# Patient Record
Sex: FEMALE | Race: WHITE | NOT HISPANIC OR LATINO | Employment: FULL TIME | URBAN - METROPOLITAN AREA
[De-identification: names, ages, dates, MRNs, and addresses within clinical notes are randomized per-mention and may not be internally consistent; named-entity substitution may affect disease eponyms.]

---

## 2017-03-27 ENCOUNTER — ALLSCRIPTS OFFICE VISIT (OUTPATIENT)
Dept: OTHER | Facility: OTHER | Age: 27
End: 2017-03-27

## 2017-03-27 DIAGNOSIS — R63.5 ABNORMAL WEIGHT GAIN: ICD-10-CM

## 2017-03-27 DIAGNOSIS — R35.0 FREQUENCY OF MICTURITION: ICD-10-CM

## 2017-03-27 LAB
BILIRUB UR QL STRIP: NEGATIVE
CLARITY UR: NORMAL
COLOR UR: YELLOW
GLUCOSE (HISTORICAL): NEGATIVE
HGB UR QL STRIP.AUTO: NEGATIVE
KETONES UR STRIP-MCNC: NEGATIVE MG/DL
LEUKOCYTE ESTERASE UR QL STRIP: NEGATIVE
NITRITE UR QL STRIP: NEGATIVE
PH UR STRIP.AUTO: 8 [PH]
PROT UR STRIP-MCNC: NEGATIVE MG/DL
SP GR UR STRIP.AUTO: 1.01
UROBILINOGEN UR QL STRIP.AUTO: 0.2

## 2017-04-05 ENCOUNTER — LAB CONVERSION - ENCOUNTER (OUTPATIENT)
Dept: OTHER | Facility: OTHER | Age: 27
End: 2017-04-05

## 2017-04-05 ENCOUNTER — GENERIC CONVERSION - ENCOUNTER (OUTPATIENT)
Dept: OTHER | Facility: OTHER | Age: 27
End: 2017-04-05

## 2017-04-05 LAB
A/G RATIO (HISTORICAL): 1.8 (CALC) (ref 1–2.5)
ALBUMIN SERPL BCP-MCNC: 4.6 G/DL (ref 3.6–5.1)
ALP SERPL-CCNC: 56 U/L (ref 33–115)
ALT SERPL W P-5'-P-CCNC: 22 U/L (ref 6–29)
ANTI-NUCLEAR ANTIBODY (ANA) (HISTORICAL): NEGATIVE
AST SERPL W P-5'-P-CCNC: 19 U/L (ref 10–30)
BASOPHILS # BLD AUTO: 0.3 %
BASOPHILS # BLD AUTO: 18 CELLS/UL (ref 0–200)
BILIRUB SERPL-MCNC: 0.5 MG/DL (ref 0.2–1.2)
BUN SERPL-MCNC: 16 MG/DL (ref 7–25)
BUN/CREA RATIO (HISTORICAL): NORMAL (CALC) (ref 6–22)
CALCIUM SERPL-MCNC: 9.8 MG/DL (ref 8.6–10.2)
CHLORIDE SERPL-SCNC: 103 MMOL/L (ref 98–110)
CHOLEST SERPL-MCNC: 184 MG/DL (ref 125–200)
CHOLEST/HDLC SERPL: 3.2 (CALC)
CO2 SERPL-SCNC: 26 MMOL/L (ref 20–31)
CREAT SERPL-MCNC: 0.65 MG/DL (ref 0.5–1.1)
DEPRECATED RDW RBC AUTO: 13 % (ref 11–15)
EGFR AFRICAN AMERICAN (HISTORICAL): 141 ML/MIN/1.73M2
EGFR-AMERICAN CALC (HISTORICAL): 122 ML/MIN/1.73M2
EOSINOPHIL # BLD AUTO: 570 CELLS/UL (ref 15–500)
EOSINOPHIL # BLD AUTO: 9.5 %
ERYTHROCYTE SEDIMENTATION RATE (HISTORICAL): 2 MM/H
GAMMA GLOBULIN (HISTORICAL): 2.6 G/DL (CALC) (ref 1.9–3.7)
GLUCOSE (HISTORICAL): 88 MG/DL (ref 65–99)
HCT VFR BLD AUTO: 45.6 % (ref 35–45)
HDLC SERPL-MCNC: 57 MG/DL
HGB BLD-MCNC: 15 G/DL (ref 11.7–15.5)
LDL CHOLESTEROL (HISTORICAL): 103 MG/DL (CALC)
LYMPHOCYTES # BLD AUTO: 1368 CELLS/UL (ref 850–3900)
LYMPHOCYTES # BLD AUTO: 22.8 %
MCH RBC QN AUTO: 30.2 PG (ref 27–33)
MCHC RBC AUTO-ENTMCNC: 33 G/DL (ref 32–36)
MCV RBC AUTO: 91.3 FL (ref 80–100)
MONOCYTES # BLD AUTO: 378 CELLS/UL (ref 200–950)
MONOCYTES (HISTORICAL): 6.3 %
NEUTROPHILS # BLD AUTO: 3666 CELLS/UL (ref 1500–7800)
NEUTROPHILS # BLD AUTO: 61.1 %
NON-HDL-CHOL (CHOL-HDL) (HISTORICAL): 127 MG/DL (CALC)
PLATELET # BLD AUTO: 235 THOUSAND/UL (ref 140–400)
PMV BLD AUTO: 9.3 FL (ref 7.5–12.5)
POTASSIUM SERPL-SCNC: 4.3 MMOL/L (ref 3.5–5.3)
RBC # BLD AUTO: 4.99 MILLION/UL (ref 3.8–5.1)
SODIUM SERPL-SCNC: 138 MMOL/L (ref 135–146)
TOTAL PROTEIN (HISTORICAL): 7.2 G/DL (ref 6.1–8.1)
TRIGL SERPL-MCNC: 121 MG/DL
TSH SERPL DL<=0.05 MIU/L-ACNC: 1.97 MIU/L
WBC # BLD AUTO: 6 THOUSAND/UL (ref 3.8–10.8)

## 2018-01-10 ENCOUNTER — ALLSCRIPTS OFFICE VISIT (OUTPATIENT)
Dept: OTHER | Facility: OTHER | Age: 28
End: 2018-01-10

## 2018-01-12 NOTE — PROGRESS NOTES
Assessment   1  Vaginal candidiasis (112 1) (B37 3)   2  Reactive airway disease (493 90) (J45 909)   3  Sinusitis (473 9) (J32 9)    Plan   Reactive airway disease    · From  EpiPen 2-Rene 0 3 MG/0 3ML DONYA USE AS DIRECTED To    EPINEPHrine 0 3 MG/0 3ML Injection Solution Auto-injector INJECT 0 3ML    INTRAMUSCULARLY AS DIRECTED   · Ventolin  (90 Base) MCG/ACT Inhalation Aerosol Solution; INHALE    TWO PUFFS EVERY 4 TO 6 HOURS AS NEEDED  Reactive airway disease, Vaginal candidiasis    · Doxycycline Monohydrate 100 MG Oral Capsule; TAKE 1 CAPSULE EVERY 12    HOURS DAILY  Vaginal candidiasis    · Fluconazole 150 MG Oral Tablet; TAKE AS DIRECTED DAILY    Discussion/Summary      Sinusitis  Patient given prescription for doxycycline to use as directed  airway disease  Patient given sample of Asmanex inhaler to use 2 inhalations twice daily  She may use her rescue inhaler as needed  candidiasis  Patient given fluconazole for history of candidiasis with antibiotics  Chief Complaint   Patient is here c/o a persistent cough and post nasal drip x's 2+ wks  All medications were reviewed and updated with the patient  History of Present Illness   HPI: I reviewed chief complaint with the patient  She denies any documented fevers  She has had some mild productive coughing feels she does not always obtain a good inspiration  She is a nonsmoker  Review of Systems        Constitutional: feeling tired  ENT: Sinus congestion  Cardiovascular: no complaints of slow or fast heart rate, no chest pain, no palpitations, no leg claudication or lower extremity edema  Respiratory: as noted in HPI  Gastrointestinal: no complaints of abdominal pain, no constipation, no nausea or diarrhea, no vomiting, no bloody stools  Genitourinary: no complaints of dysuria, no incontinence, no pelvic pain, no dysmenorrhea, no vaginal discharge or abnormal vaginal bleeding        Musculoskeletal: no complaints of arthralgia, no myalgia, no joint swelling or stiffness, no limb pain or swelling  Integumentary: no complaints of skin rash or lesion, no itching or dry skin, no skin wounds  Active Problems   1  Dermatitis (692 9) (L30 9)   2  Reactive airway disease (493 90) (J45 909)   3  Urinary frequency (788 41) (R35 0)   4  Weight gain (783 1) (R63 5)    Family History   Father    1  Family history of Hypertension (V17 49)  Family History    2  Family history of Cancer   3  Family history of Diabetes Mellitus (V18 0)   4  Family history of Heart Disease (V17 49)   5  Family history of Transient Ischemic Attack    Social History    · Being A Social Drinker   · Never A Smoker  The social history was reviewed and updated today  Surgical History   1  History of Lymphatic Surgery    Current Meds    1  EpiPen 2-Rene 0 3 MG/0 3ML DONYA; USE AS DIRECTED; Therapy: 08VLR1141 to (Last Rx:02Oct2013)  Requested for: 09WFE5104 Ordered   2  Oxybutynin Chloride ER 5 MG Oral Tablet Extended Release 24 Hour; TAKE ONE (1)     TABLET(S) DAILY; Therapy: 63Ezv2153 to (Scottie Unger)  Requested for: 71YOL8725; Last     Rx:05Jan2018 Ordered   3  Ventolin  (90 Base) MCG/ACT Inhalation Aerosol Solution; INHALE TWO PUFFS     EVERY 4 TO 6 HOURS AS NEEDED; Therapy: 47ATM6449 to ([facility])  Requested for: 51JEB5418; Last     Rx:[id number] Ordered     The medication list was reviewed and updated today  Allergies   1  Azithromycin PACK   2  Levaquin TABS   3  Penicillins   4  Shellfish-derived Products   5  Sulfa Drugs    Vitals    Recorded: 78WLP7833 09:34AM   Temperature 96 9 F   Heart Rate 72   Respiration 16   Systolic 535   Diastolic 60   Height 5 ft 1 in   Weight 171 lb 2 oz   BMI Calculated 32 33   BSA Calculated 1 77     Physical Exam        Constitutional      General appearance: No acute distress, well appearing and well nourished         Ears, Nose, Mouth, and Throat      External inspection of ears and nose: Normal        Otoscopic examination: Tympanic membranes translucent with normal light reflex  Canals patent without erythema  Nasal mucosa, septum, and turbinates: Normal without edema or erythema  Oropharynx: Normal with no erythema, edema, exudate or lesions  Pulmonary      Respiratory effort: No increased work of breathing or signs of respiratory distress  Auscultation of lungs: Abnormal  -- Mild inspiratory squeaking with deep inspiration  Cardiovascular      Auscultation of heart: Normal rate and rhythm, normal S1 and S2, without murmurs  Lymphatic      Palpation of lymph nodes in neck: No lymphadenopathy            Signatures    Electronically signed by : RADHA Us ; Mihir 10 [id number] 11:16AM EST                       (Author)

## 2018-01-14 VITALS
BODY MASS INDEX: 32 KG/M2 | WEIGHT: 169.5 LBS | DIASTOLIC BLOOD PRESSURE: 86 MMHG | SYSTOLIC BLOOD PRESSURE: 122 MMHG | TEMPERATURE: 98.5 F | RESPIRATION RATE: 16 BRPM | HEIGHT: 61 IN | HEART RATE: 68 BPM

## 2018-01-16 NOTE — RESULT NOTES
Verified Results  (1) CBC/PLT/DIFF [id number] 91:08DJ [employer]     Test Name Result Flag Reference   WHITE BLOOD CELL COUNT 6 0 Thousand/uL  3 8-10 8   RED BLOOD CELL COUNT 4 99 Million/uL  3 80-5 10   HEMOGLOBIN 15 0 g/dL  11 7-15 5   HEMATOCRIT 45 6 % H 35 0-45 0   MCV 91 3 fL  80 0-100 0   MCH 30 2 pg  27 0-33 0   MCHC 33 0 g/dL  32 0-36 0   RDW 13 0 %  11 0-15 0   PLATELET COUNT 691 Thousand/uL  140-400   MPV 9 3 fL  7 5-12 5   ABSOLUTE NEUTROPHILS 3666 cells/uL  3883-5702   ABSOLUTE LYMPHOCYTES 1368 cells/uL  850-3900   ABSOLUTE MONOCYTES 378 cells/uL  200-950   ABSOLUTE EOSINOPHILS 570 cells/uL H    ABSOLUTE BASOPHILS 18 cells/uL  0-200   NEUTROPHILS 61 1 %     LYMPHOCYTES 22 8 %     MONOCYTES 6 3 %     EOSINOPHILS 9 5 %     BASOPHILS 0 3 %       (1) COMPREHENSIVE METABOLIC PANEL [id number] 83:14VC [email or web address]     Test Name Result Flag Reference   GLUCOSE 88 mg/dL  65-99   Fasting reference interval   UREA NITROGEN (BUN) 16 mg/dL  7-25   CREATININE 0 65 mg/dL  0 50-1 10   eGFR NON-AFR   AMERICAN 122 mL/min/1 73m2  > OR = 60   eGFR AFRICAN AMERICAN 141 mL/min/1 73m2  > OR = 60   BUN/CREATININE RATIO   1-14   NOT APPLICABLE (calc)   SODIUM 138 mmol/L  135-146   POTASSIUM 4 3 mmol/L  3 5-5 3   CHLORIDE 103 mmol/L     CARBON DIOXIDE 26 mmol/L  20-31   CALCIUM 9 8 mg/dL  8 6-10 2   PROTEIN, TOTAL 7 2 g/dL  6 1-8 1   ALBUMIN 4 6 g/dL  3 6-5 1   GLOBULIN 2 6 g/dL (calc)  1 9-3 7   ALBUMIN/GLOBULIN RATIO 1 8 (calc)  1 0-2 5   BILIRUBIN, TOTAL 0 5 mg/dL  0 2-1 2   ALKALINE PHOSPHATASE 56 U/L     AST 19 U/L  10-30   ALT 22 U/L  6-29     (1) LIPID PANEL, FASTING [id number] 83:49GJ AleksandrChavo Ricketts     Test Name Result Flag Reference   CHOLESTEROL, TOTAL 184 mg/dL  125-200   HDL CHOLESTEROL 57 mg/dL  > OR = 46   TRIGLICERIDES 338 mg/dL  <150   LDL-CHOLESTEROL 103 mg/dL (calc)  <130   Desirable range <100 mg/dL for patients with CHD or  diabetes and <70 mg/dL for diabetic patients with  known heart disease  CHOL/HDLC RATIO 3 2 (calc)  < OR = 5 0   NON HDL CHOLESTEROL 127 mg/dL (calc)     Target for non-HDL cholesterol is 30 mg/dL higher than   LDL cholesterol target  (Q) SED RATE BY MODIFIED Simba Irizarry [id number] 01:13QS Chavo Jeter     Test Name Result Flag Reference   SED RATE BY MODIFIED$WESTERGREN 2 mm/h  < OR = 20     (Q) CARMEN SCREEN, IFA, WITH REFLEX TO TITER AND PATTERN [id number] 55:52JI Chavo Jeter     Test Name Result Flag Reference   CARMEN SCREEN, IFA NEGATIVE  NEGATIVE   CARMEN IFA is a first line screen for detecting the  presence of up to approximately 150 autoantibodies in  various autoimmune diseases  A negative CARMEN IFA result  suggests CARMEN-associated autoimmune diseases are not  present at this time  Visit Physician FAQs for interpretation of all  antibodies in the [place], prevalence, and association  with diseases at [email or web address] [email or web address]  faq/WRE174     (Q) TSH, 3RD GENERATION [id number] 89:27UA Chavo Dc   REPORT COMMENT:  FASTING:YES     Test Name Result Flag Reference   TSH 1 97 mIU/L     Reference Range                         > or = 20 Years  0 40-4 50                              Pregnancy Ranges            First trimester    0 26-2 66            Second trimester   0 55-2 73            Third trimester    0 43-2 91       Discussion/Summary   all bw was ok for her  I will send in med for bladder if pt is still agreeable

## 2018-01-23 VITALS
HEIGHT: 61 IN | SYSTOLIC BLOOD PRESSURE: 112 MMHG | RESPIRATION RATE: 16 BRPM | BODY MASS INDEX: 32.31 KG/M2 | DIASTOLIC BLOOD PRESSURE: 60 MMHG | WEIGHT: 171.13 LBS | HEART RATE: 72 BPM | TEMPERATURE: 96.9 F

## 2018-09-20 RX ORDER — DROSPIRENONE AND ETHINYL ESTRADIOL 0.03MG-3MG
KIT ORAL
COMMUNITY
Start: 2015-09-29 | End: 2019-02-25

## 2018-09-20 RX ORDER — FLUCONAZOLE 150 MG/1
TABLET ORAL DAILY
COMMUNITY
Start: 2018-01-10 | End: 2019-02-25

## 2018-09-20 RX ORDER — EPINEPHRINE 0.3 MG/.3ML
0.3 INJECTION SUBCUTANEOUS
COMMUNITY
Start: 2013-10-02 | End: 2020-03-16 | Stop reason: SDUPTHER

## 2018-09-20 RX ORDER — OXYBUTYNIN CHLORIDE 5 MG/1
1 TABLET, EXTENDED RELEASE ORAL DAILY
COMMUNITY
Start: 2017-04-07 | End: 2018-12-07 | Stop reason: SDUPTHER

## 2018-09-20 RX ORDER — ALBUTEROL SULFATE 90 UG/1
2 AEROSOL, METERED RESPIRATORY (INHALATION)
COMMUNITY
Start: 2013-10-02 | End: 2020-03-16 | Stop reason: SDUPTHER

## 2018-12-07 DIAGNOSIS — N32.81 OAB (OVERACTIVE BLADDER): Primary | ICD-10-CM

## 2018-12-07 RX ORDER — OXYBUTYNIN CHLORIDE 5 MG/1
TABLET, EXTENDED RELEASE ORAL
Qty: 30 TABLET | Refills: 2 | Status: SHIPPED | OUTPATIENT
Start: 2018-12-07 | End: 2019-12-27 | Stop reason: SDUPTHER

## 2019-02-25 ENCOUNTER — OFFICE VISIT (OUTPATIENT)
Dept: FAMILY MEDICINE CLINIC | Facility: CLINIC | Age: 29
End: 2019-02-25
Payer: COMMERCIAL

## 2019-02-25 VITALS
DIASTOLIC BLOOD PRESSURE: 80 MMHG | SYSTOLIC BLOOD PRESSURE: 130 MMHG | BODY MASS INDEX: 34.59 KG/M2 | HEART RATE: 92 BPM | WEIGHT: 176.2 LBS | RESPIRATION RATE: 16 BRPM | TEMPERATURE: 98.3 F | HEIGHT: 60 IN

## 2019-02-25 DIAGNOSIS — F41.9 ANXIETY: Primary | ICD-10-CM

## 2019-02-25 DIAGNOSIS — F41.8 DEPRESSION WITH ANXIETY: ICD-10-CM

## 2019-02-25 PROBLEM — R35.0 INCREASED FREQUENCY OF URINATION: Status: ACTIVE | Noted: 2017-03-27

## 2019-02-25 PROCEDURE — 1036F TOBACCO NON-USER: CPT | Performed by: FAMILY MEDICINE

## 2019-02-25 PROCEDURE — 3008F BODY MASS INDEX DOCD: CPT | Performed by: FAMILY MEDICINE

## 2019-02-25 PROCEDURE — 99213 OFFICE O/P EST LOW 20 MIN: CPT | Performed by: FAMILY MEDICINE

## 2019-02-25 NOTE — ASSESSMENT & PLAN NOTE
Depression with anxiety  I had a long discussion with the patient and we decided to initiate Zoloft at 25 mg for the 1st 4-5 days followed by 50 mg daily  She will continue see her therapist and will call within 2-4 weeks if symptoms have not begun to improve where we will titrate accordingly

## 2019-02-25 NOTE — PROGRESS NOTES
FAMILY PRACTICE OFFICE VISIT       NAME: Bob Coughlin  AGE: 33 y o  SEX: female       : 1990        MRN: [id number]    DATE: 2019  TIME: 12:12 PM    Assessment and Plan     Problem List Items Addressed This Visit        Other    Depression with anxiety     Depression with anxiety  I had a long discussion with the patient and we decided to initiate Zoloft at 25 mg for the 1st 4-5 days followed by 50 mg daily  She will continue see her therapist and will call within 2-4 weeks if symptoms have not begun to improve where we will titrate accordingly  Other Visit Diagnoses     Anxiety    -  Primary    Relevant Medications    sertraline (ZOLOFT) 50 mg tablet            There are no Patient Instructions on file for this visit  Chief Complaint     Chief Complaint   Patient presents with    Depression     Pt is here to discuss meds for depression       History of Present Illness     Patient states for the past few months she has noticed increased fatigue with decreased concentration in her work duties  She does not participate in activities she used to like doing such as drawing and writing  She works as a  for [employer]  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 with more regular at hours of 2:30 p m  To 10:30 p m  5 days a week  She has also had comments from family members such as her sister who is 5 months pregnant  Patient unaware of any triggers for her symptoms  She describes at times awakening in the middle of the night with feelings of an anxiety attack and shortness of breath  Patient did start to see a therapist recently      Review of Systems   Review of Systems   Constitutional: Positive for fatigue  Psychiatric/Behavioral: Positive for decreased concentration, dysphoric mood and sleep disturbance  The patient is nervous/anxious          Active Problem List     Patient Active Problem List   Diagnosis    Increased frequency of urination    Reactive airway disease    Depression with anxiety       Past Medical History:  No past medical history on file      Past Surgical History:  Past Surgical History:   Procedure Laterality Date    OTHER SURGICAL HISTORY      Lymphatic Surgery       Family History:  Family History   Problem Relation Age of Onset    Hypertension Father     Cancer Family     Diabetes Family     Heart disease Family     Transient ischemic attack Family        Social History:  Social History     Socioeconomic History    Marital status: Single     Spouse name: Not on file    Number of children: Not on file    Years of education: Not on file    Highest education level: Not on file   Occupational History    Not on file   Social Needs    Financial resource strain: Not on file    Food insecurity:     Worry: Not on file     Inability: Not on file    Transportation needs:     Medical: Not on file     Non-medical: Not on file   Tobacco Use    Smoking status: Never Smoker    Smokeless tobacco: Never Used   Substance and Sexual Activity    Alcohol use: Yes     Comment: Social    Drug use: Not on file    Sexual activity: Not on file   Lifestyle    Physical activity:     Days per week: Not on file     Minutes per session: Not on file    Stress: Not on file   Relationships    Social connections:     Talks on phone: Not on file     Gets together: Not on file     Attends Quaker service: Not on file     Active member of club or organization: Not on file     Attends meetings of clubs or organizations: Not on file     Relationship status: Not on file    Intimate partner violence:     Fear of current or ex partner: Not on file     Emotionally abused: Not on file     Physically abused: Not on file     Forced sexual activity: Not on file   Other Topics Concern    Not on file   Social History Narrative    Not on file       Objective     Vitals:    02/25/19 1033   BP: 130/80   Pulse: 92   Resp: 16   Temp: 98 3 °F (36 8 °C)     Wt Readings from Last 3 Encounters:   02/25/19 79 9 kg (176 lb 3 2 oz)   01/10/18 77 6 kg (171 lb 2 1 oz)   03/27/17 76 9 kg (169 lb 8 oz)       Physical Exam   Constitutional: She is oriented to person, place, and time  No distress  Cardiovascular:   Regular rate and rhythm with no murmurs   Pulmonary/Chest:   Lungs are clear to auscultation without wheezes,rales, or rhonchi   Neurological: She is alert and oriented to person, place, and time  No cranial nerve deficit  Psychiatric: She has a normal mood and affect  Her behavior is normal  Judgment and thought content normal        Pertinent Laboratory/Diagnostic Studies:  Lab Results   Component Value Date    BUN 16 03/31/2017    CREATININE 0 65 03/31/2017    CALCIUM 9 8 03/31/2017     03/31/2017    K 4 3 03/31/2017    CO2 26 03/31/2017     03/31/2017     Lab Results   Component Value Date    ALT 22 03/31/2017    AST 19 03/31/2017    ALKPHOS 56 03/31/2017    BILITOT 0 5 03/31/2017       Lab Results   Component Value Date    WBC 6 0 03/31/2017    HGB 15 0 03/31/2017    HCT 45 6 (H) 03/31/2017    MCV 91 3 03/31/2017     03/31/2017       No results found for: TSH    Lab Results   Component Value Date    CHOL 184 03/31/2017     Lab Results   Component Value Date    TRIG 121 03/31/2017     Lab Results   Component Value Date    HDL 57 03/31/2017     No results found for: LDLCALC  No results found for: HGBA1C    Results for orders placed or performed in visit on 04/05/17   SEDIMENTATION RATE (HISTORICAL)   Result Value Ref Range    ERYTHROCYTE SEDIMENTATION RATE 2 < OR = 20 mm/h   TSH, 3RD GENERATION (HISTORICAL)   Result Value Ref Range    TSH 3RD GENERATON 1 97 mIU/L   CARMEN,SCREEN (HISTORICAL)   Result Value Ref Range    ANTI-NUCLEAR ANTIBODY (CARMEN) NEGATIVE NEGATIVE       No orders of the defined types were placed in this encounter        ALLERGIES:  Allergies   Allergen Reactions    Penicillins Shortness Of Breath    Shellfish-Derived Products Anaphylaxis     Throat closes    Azithromycin Hives    Levofloxacin Hives    Sulfa Antibiotics Hives       Current Medications     Current Outpatient Medications   Medication Sig Dispense Refill    albuterol (VENTOLIN HFA) 90 mcg/act inhaler Inhale 2 puffs      EPINEPHrine (EPIPEN) 0 3 mg/0 3 mL SOAJ Inject 0 3 mL as directed      Multiple Vitamin (MULTIVITAMINS PO) Take by mouth      oxybutynin (DITROPAN-XL) 5 mg 24 hr tablet TAKE ONE TABLET BY MOUTH DAILY 30 tablet 2    sertraline (ZOLOFT) 50 mg tablet Take 1 tablet (50 mg total) by mouth daily 30 tablet 5     No current facility-administered medications for this visit            Health Maintenance     Health Maintenance   Topic Date Due    BMI: Followup Plan  08/11/2007    BMI: Adult  08/11/2007    DTaP,Tdap,and Td Vaccines (1 - Tdap) 08/11/2010    PAP SMEAR  08/11/2010    INFLUENZA VACCINE  07/01/2018    Depression Screening PHQ  03/25/2019 (Originally 1989)    HEPATITIS B VACCINES  Aged Out     Immunization History   Administered Date(s) Administered    DTP 1989, 02/26/1990, 12/10/1990, 06/10/1991    HiB 12/10/1990    MMR 12/10/1990    OPV 1989, 02/26/1990, 47/17/5196       Connie Bartlett MD

## 2019-06-03 ENCOUNTER — TELEPHONE (OUTPATIENT)
Dept: FAMILY MEDICINE CLINIC | Facility: CLINIC | Age: 29
End: 2019-06-03

## 2019-06-03 DIAGNOSIS — F41.9 ANXIETY: ICD-10-CM

## 2019-06-03 RX ORDER — SERTRALINE HYDROCHLORIDE 100 MG/1
100 TABLET, FILM COATED ORAL DAILY
Qty: 30 TABLET | Refills: 5 | Status: SHIPPED | OUTPATIENT
Start: 2019-06-03 | End: 2020-01-27

## 2019-06-12 ENCOUNTER — TELEPHONE (OUTPATIENT)
Dept: FAMILY MEDICINE CLINIC | Facility: CLINIC | Age: 29
End: 2019-06-12

## 2019-12-27 DIAGNOSIS — N32.81 OAB (OVERACTIVE BLADDER): ICD-10-CM

## 2019-12-27 RX ORDER — OXYBUTYNIN CHLORIDE 5 MG/1
TABLET, EXTENDED RELEASE ORAL
Qty: 30 TABLET | Refills: 3 | Status: SHIPPED | OUTPATIENT
Start: 2019-12-27 | End: 2020-03-16 | Stop reason: SDUPTHER

## 2020-01-25 DIAGNOSIS — F41.9 ANXIETY: ICD-10-CM

## 2020-01-27 RX ORDER — SERTRALINE HYDROCHLORIDE 100 MG/1
TABLET, FILM COATED ORAL
Qty: 30 TABLET | Refills: 0 | Status: SHIPPED | OUTPATIENT
Start: 2020-01-27 | End: 2020-03-16 | Stop reason: SDUPTHER

## 2020-03-16 ENCOUNTER — OFFICE VISIT (OUTPATIENT)
Dept: FAMILY MEDICINE CLINIC | Facility: CLINIC | Age: 30
End: 2020-03-16
Payer: COMMERCIAL

## 2020-03-16 VITALS
DIASTOLIC BLOOD PRESSURE: 80 MMHG | WEIGHT: 155.8 LBS | BODY MASS INDEX: 29.42 KG/M2 | TEMPERATURE: 96.8 F | OXYGEN SATURATION: 98 % | RESPIRATION RATE: 15 BRPM | HEART RATE: 63 BPM | SYSTOLIC BLOOD PRESSURE: 120 MMHG | HEIGHT: 61 IN

## 2020-03-16 DIAGNOSIS — F41.9 ANXIETY: ICD-10-CM

## 2020-03-16 DIAGNOSIS — J45.909 MILD REACTIVE AIRWAYS DISEASE, UNSPECIFIED WHETHER PERSISTENT: Primary | ICD-10-CM

## 2020-03-16 DIAGNOSIS — L30.9 ECZEMA, UNSPECIFIED TYPE: ICD-10-CM

## 2020-03-16 DIAGNOSIS — N32.81 OAB (OVERACTIVE BLADDER): ICD-10-CM

## 2020-03-16 PROCEDURE — 3008F BODY MASS INDEX DOCD: CPT | Performed by: FAMILY MEDICINE

## 2020-03-16 PROCEDURE — 1036F TOBACCO NON-USER: CPT | Performed by: FAMILY MEDICINE

## 2020-03-16 PROCEDURE — 99213 OFFICE O/P EST LOW 20 MIN: CPT | Performed by: FAMILY MEDICINE

## 2020-03-16 RX ORDER — OXYBUTYNIN CHLORIDE 5 MG/1
5 TABLET, EXTENDED RELEASE ORAL DAILY
Qty: 90 TABLET | Refills: 3 | Status: SHIPPED | OUTPATIENT
Start: 2020-03-16 | End: 2021-07-27 | Stop reason: ALTCHOICE

## 2020-03-16 RX ORDER — SERTRALINE HYDROCHLORIDE 100 MG/1
100 TABLET, FILM COATED ORAL DAILY
Qty: 90 TABLET | Refills: 3 | Status: SHIPPED | OUTPATIENT
Start: 2020-03-16 | End: 2021-06-28 | Stop reason: SDUPTHER

## 2020-03-16 RX ORDER — ALBUTEROL SULFATE 90 UG/1
2 AEROSOL, METERED RESPIRATORY (INHALATION) EVERY 6 HOURS PRN
Qty: 1 INHALER | Refills: 3 | Status: SHIPPED | OUTPATIENT
Start: 2020-03-16

## 2020-03-16 RX ORDER — EPINEPHRINE 0.3 MG/.3ML
0.3 INJECTION SUBCUTANEOUS ONCE
Qty: 0.6 ML | Refills: 3 | Status: SHIPPED | OUTPATIENT
Start: 2020-03-16 | End: 2021-07-27

## 2020-03-16 RX ORDER — DESONIDE 0.5 MG/G
CREAM TOPICAL 2 TIMES DAILY
Qty: 60 G | Refills: 2 | Status: SHIPPED | OUTPATIENT
Start: 2020-03-16

## 2020-03-16 RX ORDER — ALBUTEROL SULFATE 2.5 MG/3ML
2.5 SOLUTION RESPIRATORY (INHALATION) EVERY 6 HOURS PRN
Qty: 25 VIAL | Refills: 5 | Status: SHIPPED | OUTPATIENT
Start: 2020-03-16

## 2020-03-16 NOTE — ASSESSMENT & PLAN NOTE
Eczema  Patient given prescription for DesOwen cream to apply twice daily to the affected area  She was also referred to allergist for further evaluation and treatment of her symptoms

## 2020-03-16 NOTE — PROGRESS NOTES
FAMILY PRACTICE OFFICE VISIT       NAME: Tung Coughlin  AGE: 26 y o  SEX: female       : 1990        MRN: [id number]    DATE: 3/16/2020  TIME: 4:22 PM    Assessment and Plan     Problem List Items Addressed This Visit        Respiratory    Reactive airway disease - Primary    Relevant Medications    EPINEPHrine (EPIPEN) 0 3 mg/0 3 mL SOAJ    albuterol (2 5 mg/3 mL) 0 083 % nebulizer solution    albuterol (Ventolin HFA) 90 mcg/act inhaler    Other Relevant Orders    Ambulatory referral to Allergy       Musculoskeletal and Integument    Eczema     Eczema  Patient given prescription for DesOwen cream to apply twice daily to the affected area  She was also referred to allergist for further evaluation and treatment of her symptoms  Relevant Medications    desonide (DESOWEN) 0 05 % cream       Genitourinary    OAB (overactive bladder)     Overactive bladder  Patient was given a refill on her oxybutynin medication as requested         Relevant Medications    oxybutynin (DITROPAN-XL) 5 mg 24 hr tablet       Other    Anxiety     Anxiety  Patient was given a refill on her sertraline medication as ordered         Relevant Medications    sertraline (ZOLOFT) 100 mg tablet              Chief Complaint     Chief Complaint   Patient presents with    Medication Refill       History of Present Illness     Patient in the office for follow-up of chronic medical condition  She states sertraline has been working well for her anxiety and would like to continue with this medication  She also states oxybutynin has been working for her overactive bladder syndrome  She continues to get episodic flare-ups of her dry pruritic rash that she experiences around her perioral area as well as bilateral antecubital area of arms and anterior neck  She states prior treatments with prednisone did seem to significantly subside symptoms    She is unaware of any prior history of documented allergies      Review of Systems Review of Systems   Constitutional: Negative  Respiratory: Negative  Cardiovascular: Negative  Gastrointestinal: Negative  Genitourinary:        As per HPI   Skin:        As per HPI   Psychiatric/Behavioral:        As per HPI       Active Problem List     Patient Active Problem List   Diagnosis    Increased frequency of urination    Reactive airway disease    Depression with anxiety    OAB (overactive bladder)    Eczema    Anxiety       Past Medical History:  No past medical history on file      Past Surgical History:  Past Surgical History:   Procedure Laterality Date    OTHER SURGICAL HISTORY      Lymphatic Surgery       Family History:  Family History   Problem Relation Age of Onset    Hypertension Father     Cancer Family     Diabetes Family     Heart disease Family     Transient ischemic attack Family        Social History:  Social History     Socioeconomic History    Marital status: Single     Spouse name: Not on file    Number of children: Not on file    Years of education: Not on file    Highest education level: Not on file   Occupational History    Not on file   Social Needs    Financial resource strain: Not on file    Food insecurity:     Worry: Not on file     Inability: Not on file    Transportation needs:     Medical: Not on file     Non-medical: Not on file   Tobacco Use    Smoking status: Never Smoker    Smokeless tobacco: Never Used   Substance and Sexual Activity    Alcohol use: Yes     Comment: Social    Drug use: Not on file    Sexual activity: Not on file   Lifestyle    Physical activity:     Days per week: Not on file     Minutes per session: Not on file    Stress: Not on file   Relationships    Social connections:     Talks on phone: Not on file     Gets together: Not on file     Attends Voodoo service: Not on file     Active member of club or organization: Not on file     Attends meetings of clubs or organizations: Not on file     Relationship status: Not on file    Intimate partner violence:     Fear of current or ex partner: Not on file     Emotionally abused: Not on file     Physically abused: Not on file     Forced sexual activity: Not on file   Other Topics Concern    Not on file   Social History Narrative    Not on file       Objective     Vitals:    03/16/20 1350   BP: 120/80   Pulse: 63   Resp: 15   Temp: (!) 96 8 °F (36 °C)   SpO2: 98%     Wt Readings from Last 3 Encounters:   03/16/20 70 7 kg (155 lb 12 8 oz)   02/25/19 79 9 kg (176 lb 3 2 oz)   01/10/18 77 6 kg (171 lb 2 1 oz)       Physical Exam   Constitutional: No distress  Skin:   Patient with dry pruritic skin around bilateral perioral area extending to anterior chin  She also has similar skin along bilateral antecubital areas of her arms    She has less of a rash along anterior neck in upper chest area       Pertinent Laboratory/Diagnostic Studies:  Lab Results   Component Value Date    BUN 16 03/31/2017    CREATININE 0 65 03/31/2017    CALCIUM 9 8 03/31/2017     03/31/2017    K 4 3 03/31/2017    CO2 26 03/31/2017     03/31/2017     Lab Results   Component Value Date    ALT 22 03/31/2017    AST 19 03/31/2017    ALKPHOS 56 03/31/2017    BILITOT 0 5 03/31/2017       Lab Results   Component Value Date    WBC 6 0 03/31/2017    HGB 15 0 03/31/2017    HCT 45 6 (H) 03/31/2017    MCV 91 3 03/31/2017     03/31/2017       No results found for: TSH    Lab Results   Component Value Date    CHOL 184 03/31/2017     Lab Results   Component Value Date    TRIG 121 03/31/2017     Lab Results   Component Value Date    HDL 57 03/31/2017     No results found for: LDLCALC  No results found for: HGBA1C    Results for orders placed or performed in visit on 04/05/17   SEDIMENTATION RATE (HISTORICAL)   Result Value Ref Range    ERYTHROCYTE SEDIMENTATION RATE 2 < OR = 20 mm/h   TSH, 3RD GENERATION (HISTORICAL)   Result Value Ref Range    TSH 3RD GENERATON 1 97 mIU/L   CARMEN,SCREEN (HISTORICAL) Result Value Ref Range    ANTI-NUCLEAR ANTIBODY (CARMEN) NEGATIVE NEGATIVE       Orders Placed This Encounter   Procedures    Ambulatory referral to Allergy       ALLERGIES:  Allergies   Allergen Reactions    Penicillins Shortness Of Breath    Shellfish-Derived Products Anaphylaxis     Throat closes    Azithromycin Hives    Levofloxacin Hives    Sulfa Antibiotics Hives       Current Medications     Current Outpatient Medications   Medication Sig Dispense Refill    albuterol (Ventolin HFA) 90 mcg/act inhaler Inhale 2 puffs every 6 (six) hours as needed for wheezing 1 Inhaler 3    EPINEPHrine (EPIPEN) 0 3 mg/0 3 mL SOAJ Inject 0 3 mL (0 3 mg total) into a muscle once for 1 dose 0 6 mL 3    Multiple Vitamin (MULTIVITAMINS PO) Take by mouth      oxybutynin (DITROPAN-XL) 5 mg 24 hr tablet Take 1 tablet (5 mg total) by mouth daily 90 tablet 3    sertraline (ZOLOFT) 100 mg tablet Take 1 tablet (100 mg total) by mouth daily 90 tablet 3    albuterol (2 5 mg/3 mL) 0 083 % nebulizer solution Take 1 vial (2 5 mg total) by nebulization every 6 (six) hours as needed for wheezing or shortness of breath 25 vial 5    desonide (DESOWEN) 0 05 % cream Apply topically 2 (two) times a day 60 g 2     No current facility-administered medications for this visit            Health Maintenance     Health Maintenance   Topic Date Due    HIV Screening  08/11/2004    BMI: Followup Plan  08/11/2007    Annual Physical  08/11/2007    Cervical Cancer Screening  08/11/2010    Influenza Vaccine  03/16/2020 (Originally 7/1/2019)    BMI: Adult  03/16/2021    DTaP,Tdap,and Td Vaccines (6 - Td) 08/06/2029    Pneumococcal Vaccine: 65+ Years (1 of 2 - PCV13) 08/11/2054    HIB Vaccine  Completed    Pneumococcal Vaccine: Pediatrics (0 to 5 Years) and At-Risk Patients (6 to 59 Years)  Aged Out    Hepatitis B Vaccine  Aged Out    IPV Vaccine  Aged Out    Hepatitis A Vaccine  Aged Out    Meningococcal ACWY Vaccine  Aged Out    HPV Vaccine  Aged Out     Immunization History   Administered Date(s) Administered    DTP 1989, 02/26/1990, 12/10/1990, 06/10/1991    HiB 12/10/1990    MMR 12/10/1990    OPV 1989, 02/26/1990, 12/10/1990    Tdap 75/97/0797       Andrea Mckenna MD

## 2020-03-24 ENCOUNTER — OFFICE VISIT (OUTPATIENT)
Dept: FAMILY MEDICINE CLINIC | Facility: CLINIC | Age: 30
End: 2020-03-24
Payer: COMMERCIAL

## 2020-03-24 DIAGNOSIS — Z20.828 EXPOSURE TO SARS-ASSOCIATED CORONAVIRUS: ICD-10-CM

## 2020-03-24 DIAGNOSIS — Z20.828 EXPOSURE TO SARS-ASSOCIATED CORONAVIRUS: Primary | ICD-10-CM

## 2020-03-24 PROCEDURE — G2012 BRIEF CHECK IN BY MD/QHP: HCPCS | Performed by: NURSE PRACTITIONER

## 2020-03-24 PROCEDURE — 87635 SARS-COV-2 COVID-19 AMP PRB: CPT

## 2020-03-24 NOTE — PROGRESS NOTES
COVID-19 Virtual Visit     This virtual check-in was done via telephone  Encounter provider [address] W[address]FREDO Martínez    Provider located at [address] [place] [address]    Recent Visits  No visits were found meeting these conditions  Showing recent visits within past 7 days and meeting all other requirements     Today's Visits  Date Type Provider Dept   03/24/20 Office Visit Amy Herbie Peabody, [facility] today's visits and meeting all other requirements     Future Appointments  Date Type Provider Dept   03/24/20 Office Visit Amy Herbie Peabody, [facility]   Showing future appointments within next 150 days and meeting all other requirements        Patient agrees to participate in a virtual check in via telephone or video visit instead of presenting to the office to address urgent/immediate medical needs  Patient is aware this is a billable service  After connecting through telephone, the patient was identified by name and date of birth  Radha Shepard was informed that this was a telemedicine visit and that the exam was being conducted confidentially over secure lines  My office door was closed  No one else was in the room  Radha Shepard acknowledged consent and understanding of privacy and security of the telemedicine visit  I informed the patient that I have reviewed her record in Epic and presented the opportunity for her to ask any questions regarding the visit today  The patient agreed to participate  Radha Shepard is a 26 y o  female who is concerned about COVID-19  She reports cough and headache, itchy throat, joint aches, hot flashes  Has not taken her temperature  She has not traveled outside the [place]  within the last 14 days    She has had contact with a person who is under investigation for or who is positive for COVID-19 within the last 14 days   She has not been hospitalized recently for fever and/or lower respiratory symptoms  Some dyspnea, just feels like she can't catch her breath sometimes  Has asthma, but has not felt she has needed her inhaler yet  She has been using hot tea and hot coffee, which is helpful in relieving symptoms  She works at a [employer] as a   Another employee tested positive for COVID-19  Employee last worked a 2 hour shift sometime between March 12-14th  She does not know if she had contact with the employee, she sees multiple people 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  Her brother in law and her parents have similar symptoms, she was in contact with them over this weekend  No past medical history on file  Past Surgical History:   Procedure Laterality Date    OTHER SURGICAL HISTORY      Lymphatic Surgery       Current Outpatient Medications   Medication Sig Dispense Refill    albuterol (2 5 mg/3 mL) 0 083 % nebulizer solution Take 1 vial (2 5 mg total) by nebulization every 6 (six) hours as needed for wheezing or shortness of breath 25 vial 5    albuterol (Ventolin HFA) 90 mcg/act inhaler Inhale 2 puffs every 6 (six) hours as needed for wheezing 1 Inhaler 3    oxybutynin (DITROPAN-XL) 5 mg 24 hr tablet Take 1 tablet (5 mg total) by mouth daily 90 tablet 3    sertraline (ZOLOFT) 100 mg tablet Take 1 tablet (100 mg total) by mouth daily 90 tablet 3    desonide (DESOWEN) 0 05 % cream Apply topically 2 (two) times a day 60 g 2    EPINEPHrine (EPIPEN) 0 3 mg/0 3 mL SOAJ Inject 0 3 mL (0 3 mg total) into a muscle once for 1 dose 0 6 mL 3    Multiple Vitamin (MULTIVITAMINS PO) Take by mouth       No current facility-administered medications for this visit  Allergies   Allergen Reactions    Penicillins Shortness Of Breath    Shellfish-Derived Products Anaphylaxis     Throat closes    Azithromycin Hives    Levofloxacin Hives    Sulfa Antibiotics Hives       Assessment:   This 26year old female presents today with upper respiratory symptoms that began 4 days ago  Symptoms are likely viral URI, however cannot exclude COVID-19 as she may have had contact with a co-worker who tested positive  Send for COVID-19 to centralized site at [employer]  She will self isolate until results of swab return  Office will call her with results of swab when available  She will call for any worsening symptoms, fever, shortness of breath in the interim  Disposition:    I referred Purnima Gonzales to one of our centralized sites for a COVID-19 swab  I spent 20 minutes with the patient during this virtual check-in visit

## 2020-03-29 LAB — SARS-COV-2 RNA SPEC QL NAA+PROBE: NOT DETECTED

## 2020-03-30 ENCOUNTER — TELEPHONE (OUTPATIENT)
Dept: FAMILY MEDICINE CLINIC | Facility: CLINIC | Age: 30
End: 2020-03-30

## 2020-03-30 NOTE — TELEPHONE ENCOUNTER
Spoke to patient via telephone  COVID-19 swab is negative  She is feeling better  Still has occasional cough, 2-3 coughs at night time, no coughing the rest of the day  May return to work today  Note provided

## 2020-09-22 ENCOUNTER — TELEMEDICINE (OUTPATIENT)
Dept: FAMILY MEDICINE CLINIC | Facility: CLINIC | Age: 30
End: 2020-09-22
Payer: COMMERCIAL

## 2020-09-22 VITALS — HEART RATE: 80 BPM | WEIGHT: 175 LBS | BODY MASS INDEX: 33.07 KG/M2

## 2020-09-22 DIAGNOSIS — N32.81 OAB (OVERACTIVE BLADDER): Primary | ICD-10-CM

## 2020-09-22 DIAGNOSIS — M54.50 CHRONIC MIDLINE LOW BACK PAIN WITHOUT SCIATICA: ICD-10-CM

## 2020-09-22 DIAGNOSIS — G89.29 CHRONIC MIDLINE LOW BACK PAIN WITHOUT SCIATICA: ICD-10-CM

## 2020-09-22 PROCEDURE — 99213 OFFICE O/P EST LOW 20 MIN: CPT | Performed by: FAMILY MEDICINE

## 2020-09-22 PROCEDURE — 1036F TOBACCO NON-USER: CPT | Performed by: FAMILY MEDICINE

## 2020-09-22 NOTE — ASSESSMENT & PLAN NOTE
Overactive bladder  Patient was instructed to increase her oxybutynin to 10 mg daily to see if this would improve her control of urinary incontinence specifically around the time of her menstrual period  If symptoms persist without improvement she will have consultation with her gynecologist for further evaluation    She will also obtain blood work as ordered

## 2020-09-22 NOTE — PROGRESS NOTES
Virtual Regular Visit      Assessment/Plan:    Problem List Items Addressed This Visit        Genitourinary    OAB (overactive bladder) - Primary     Overactive bladder  Patient was instructed to increase her oxybutynin to 10 mg daily to see if this would improve her control of urinary incontinence specifically around the time of her menstrual period  If symptoms persist without improvement she will have consultation with her gynecologist for further evaluation  She will also obtain blood work as ordered         Relevant Orders    TSH, 3rd generation    Comprehensive metabolic panel    CBC      Other Visit Diagnoses     Chronic midline low back pain without sciatica        Relevant Orders    TSH, 3rd generation    Comprehensive metabolic panel    CBC               Reason for visit is   Chief Complaint   Patient presents with    Follow-up     medication, oxybutinin    vital signs     unable to obtain any vital signs        Encounter provider Danielle Whelan MD    Provider located at [address] [place] [id number]      Recent Visits  No visits were found meeting these conditions  Showing recent visits within past 7 days and meeting all other requirements     Today's Visits  Date Type Provider Dept   09/22/20 Telemedicine Danielle Whelan MD [facility] today's visits and meeting all other requirements     Future Appointments  No visits were found meeting these conditions  Showing future appointments within next 150 days and meeting all other requirements        The patient was identified by name and date of birth  Ann Mariekarie Filibertoalicia was informed that this is a telemedicine visit and that the visit is being conducted through [facility] and patient was informed that this is not a secure, HIPAA-complaint platform  She agrees to proceed     My office door was closed  No one else was in the room    She acknowledged consent and understanding of privacy and security of the video platform  The patient has agreed to participate and understands they can discontinue the visit at any time  Patient is aware this is a billable service  Angus Majano is a 31 y o  female      Patient in the office to review chronic medical condition  She reports that oxybutynin had been working well for her urinary urgency however over the past year she has noticed a cyclical pattern that whenever she is about to have her menstrual cycle her urinary incontinence becomes more problematic and medications not to be working well until after her  Has resolved  She does get a monthly cycle on a regular basis  She denies any other new medications she has started  She denies any dysuria  She does get occasional back pain which she feels is related to being a  at [employer] which requires her to do a fair amount of bending of her back       No past medical history on file      Past Surgical History:   Procedure Laterality Date    OTHER SURGICAL HISTORY      Lymphatic Surgery       Current Outpatient Medications   Medication Sig Dispense Refill    oxybutynin (DITROPAN-XL) 5 mg 24 hr tablet Take 1 tablet (5 mg total) by mouth daily 90 tablet 3    sertraline (ZOLOFT) 100 mg tablet Take 1 tablet (100 mg total) by mouth daily 90 tablet 3    albuterol (2 5 mg/3 mL) 0 083 % nebulizer solution Take 1 vial (2 5 mg total) by nebulization every 6 (six) hours as needed for wheezing or shortness of breath (Patient not taking: Reported on 9/22/2020) 25 vial 5    albuterol (Ventolin HFA) 90 mcg/act inhaler Inhale 2 puffs every 6 (six) hours as needed for wheezing (Patient not taking: Reported on 9/22/2020) 1 Inhaler 3    desonide (DESOWEN) 0 05 % cream Apply topically 2 (two) times a day (Patient not taking: Reported on 9/22/2020) 60 g 2    EPINEPHrine (EPIPEN) 0 3 mg/0 3 mL SOAJ Inject 0 3 mL (0 3 mg total) into a muscle once for 1 dose 0 6 mL 3    Multiple Vitamin (MULTIVITAMINS PO) Take by mouth       No current facility-administered medications for this visit  Allergies   Allergen Reactions    Penicillins Shortness Of Breath    Shellfish-Derived Products Anaphylaxis     Throat closes    Azithromycin Hives    Levofloxacin Hives    Sulfa Antibiotics Hives       Review of Systems   Constitutional: Negative  HENT: Negative  Respiratory: Negative  Cardiovascular: Negative  Gastrointestinal: Negative  Genitourinary:        As per HPI   Musculoskeletal:        As per HPI       Video Exam    Vitals:    09/22/20 1046   Pulse: 80   Weight: 79 4 kg (175 lb)       Physical Exam  Constitutional:       General: She is not in acute distress  Appearance: Normal appearance  She is not ill-appearing  Eyes:      General:         Right eye: No discharge  Left eye: No discharge  Extraocular Movements: Extraocular movements intact  Conjunctiva/sclera: Conjunctivae normal       Pupils: Pupils are equal, round, and reactive to light  Pulmonary:      Effort: Pulmonary effort is normal  No respiratory distress  Neurological:      General: No focal deficit present  Mental Status: She is alert and oriented to person, place, and time  Psychiatric:         Mood and Affect: Mood normal          Behavior: Behavior normal          Thought Content: Thought content normal          Judgment: Judgment normal           I spent 12 minutes with patient today in which greater than 50% of the time was spent in counseling/coordination of care regarding [address] acknowledges that she has consented to an online visit or consultation  She understands that the online visit is based solely on information provided by her, and that, in the absence of a face-to-face physical evaluation by the physician, the diagnosis she receives is both limited and provisional in terms of accuracy and completeness   This is not intended to replace a full medical face-to-face evaluation by the physician  Nikko Olvera understands and accepts these terms

## 2020-11-02 ENCOUNTER — TELEPHONE (OUTPATIENT)
Dept: FAMILY MEDICINE CLINIC | Facility: CLINIC | Age: 30
End: 2020-11-02

## 2020-11-02 DIAGNOSIS — Z20.822 EXPOSURE TO COVID-19 VIRUS: Primary | ICD-10-CM

## 2020-12-23 ENCOUNTER — TELEMEDICINE (OUTPATIENT)
Dept: FAMILY MEDICINE CLINIC | Facility: CLINIC | Age: 30
End: 2020-12-23
Payer: COMMERCIAL

## 2020-12-23 VITALS — TEMPERATURE: 98.9 F

## 2020-12-23 DIAGNOSIS — B34.9 VIRAL INFECTION, UNSPECIFIED: ICD-10-CM

## 2020-12-23 DIAGNOSIS — Z03.818 ENCOUNTER FOR OBSERVATION FOR SUSPECTED EXPOSURE TO OTHER BIOLOGICAL AGENTS RULED OUT: ICD-10-CM

## 2020-12-23 PROCEDURE — 99213 OFFICE O/P EST LOW 20 MIN: CPT | Performed by: NURSE PRACTITIONER

## 2020-12-23 PROCEDURE — U0003 INFECTIOUS AGENT DETECTION BY NUCLEIC ACID (DNA OR RNA); SEVERE ACUTE RESPIRATORY SYNDROME CORONAVIRUS 2 (SARS-COV-2) (CORONAVIRUS DISEASE [COVID-19]), AMPLIFIED PROBE TECHNIQUE, MAKING USE OF HIGH THROUGHPUT TECHNOLOGIES AS DESCRIBED BY CMS-2020-01-R: HCPCS | Performed by: NURSE PRACTITIONER

## 2020-12-24 LAB — SARS-COV-2 RNA SPEC QL NAA+PROBE: NOT DETECTED

## 2021-06-24 ENCOUNTER — OFFICE VISIT (OUTPATIENT)
Dept: FAMILY MEDICINE CLINIC | Facility: CLINIC | Age: 31
End: 2021-06-24
Payer: COMMERCIAL

## 2021-06-24 VITALS — BODY MASS INDEX: 34.55 KG/M2 | WEIGHT: 183 LBS | HEIGHT: 61 IN

## 2021-06-24 DIAGNOSIS — F41.8 DEPRESSION WITH ANXIETY: ICD-10-CM

## 2021-06-24 DIAGNOSIS — J06.9 UPPER RESPIRATORY TRACT INFECTION, UNSPECIFIED TYPE: Primary | ICD-10-CM

## 2021-06-24 PROCEDURE — 99214 OFFICE O/P EST MOD 30 MIN: CPT | Performed by: FAMILY MEDICINE

## 2021-06-24 PROCEDURE — 1036F TOBACCO NON-USER: CPT | Performed by: FAMILY MEDICINE

## 2021-06-24 PROCEDURE — 3008F BODY MASS INDEX DOCD: CPT | Performed by: FAMILY MEDICINE

## 2021-06-24 RX ORDER — SULFAMETHOXAZOLE AND TRIMETHOPRIM 800; 160 MG/1; MG/1
TABLET ORAL
COMMUNITY
Start: 2021-06-23 | End: 2021-07-27 | Stop reason: ALTCHOICE

## 2021-06-24 RX ORDER — DEXTROMETHORPHAN HYDROBROMIDE AND PROMETHAZINE HYDROCHLORIDE 15; 6.25 MG/5ML; MG/5ML
5 SYRUP ORAL 4 TIMES DAILY PRN
COMMUNITY
Start: 2021-06-23

## 2021-06-24 RX ORDER — SULFAMETHOXAZOLE AND TRIMETHOPRIM 800; 160 MG/1; MG/1
TABLET ORAL 2 TIMES DAILY
COMMUNITY
Start: 2021-06-23 | End: 2021-06-30

## 2021-06-24 RX ORDER — FLUTICASONE PROPIONATE 50 MCG
2 SPRAY, SUSPENSION (ML) NASAL DAILY
COMMUNITY
Start: 2021-06-23 | End: 2021-07-27

## 2021-06-24 NOTE — ASSESSMENT & PLAN NOTE
Depression with anxiety  Since patient is tolerating medicine well she will increase her  Zoloft to 150 mg daily    She will call within 3-4  weeks if symptoms have not improved whereby we will consider titrating further

## 2021-06-24 NOTE — PROGRESS NOTES
Virtual Regular Visit      Assessment/Plan:    Problem List Items Addressed This Visit        Respiratory    Upper respiratory tract infection - Primary       URI  Patient will await results of COVID testing  If she is negative we will add prednisone tapering dose to her current antibiotic  If she is positive she is aware to be out of work for minimum of 10 days and symptoms resolved before she returns to work  Other    Depression with anxiety       Depression with anxiety  Since patient is tolerating medicine well she will increase her  Zoloft to 150 mg daily  She will call within 3-4  weeks if symptoms have not improved whereby we will consider titrating further                    Reason for visit is   Chief Complaint   Patient presents with    Virtual Brief Visit        Encounter provider Vicki Mccracken MD    Provider located at [address] [place] [address]      Recent Visits  No visits were found meeting these conditions  Showing recent visits within past 7 days and meeting all other requirements  Today's Visits  Date Type Provider Dept   06/24/21 Office Visit Vicki Mccracken MD [facility] today's visits and meeting all other requirements  Future Appointments  No visits were found meeting these conditions  Showing future appointments within next 150 days and meeting all other requirements       The patient was identified by name and date of birth  Teresita Nicanor was informed that this is a telemedicine visit and that the visit is being conducted through [address] [place] [address] and patient was informed that this is a secure, HIPAA-compliant platform  She agrees to proceed     My office door was closed  No one else was in the room  She acknowledged consent and understanding of privacy and security of the video platform  The patient has agreed to participate and understands they can discontinue the visit at any time      Patient is aware this is a billable service  Subjective  Vilma Tapia is a 31 y o  female       Patient having telemedicine visit with complaints of cough, congestion, nausea and vomiting  Which began on Sunday June 20th  She was seen at an urgent care center yesterday where she had a negative strep test and had COVID testing  She still awaiting results of COVID test   Patient did have prior COVID vaccination  She denies any fevers  She was prescribed Bactrim DS to take 1 b i d  for 10 days  She continues to have significant coughing and mucus production  She has a hoarse voice  Patient also states her current dose of Zoloft has helped somewhat however she continues to feel chronic fatigue and feeling down and sad  She is tolerating medicine well and requested increase in dosing  She also has needed to take some mental health days off of work and she was recommended to have FMLA paperwork filled out by her employer  History reviewed  No pertinent past medical history      Past Surgical History:   Procedure Laterality Date    OTHER SURGICAL HISTORY      Lymphatic Surgery       Current Outpatient Medications   Medication Sig Dispense Refill    albuterol (2 5 mg/3 mL) 0 083 % nebulizer solution Take 1 vial (2 5 mg total) by nebulization every 6 (six) hours as needed for wheezing or shortness of breath 25 vial 5    albuterol (Ventolin HFA) 90 mcg/act inhaler Inhale 2 puffs every 6 (six) hours as needed for wheezing 1 Inhaler 3    desonide (DESOWEN) 0 05 % cream Apply topically 2 (two) times a day 60 g 2    fluticasone (FLONASE) 50 mcg/act nasal spray 2 sprays into each nostril daily      Multiple Vitamin (MULTIVITAMINS PO) Take by mouth      oxybutynin (DITROPAN-XL) 5 mg 24 hr tablet Take 1 tablet (5 mg total) by mouth daily 90 tablet 3    promethazine-dextromethorphan (PHENERGAN-DM) 6 25-15 mg/5 mL oral syrup Take 5 mL by mouth 4 (four) times a day as needed      sertraline (ZOLOFT) 100 mg tablet Take 1 tablet (100 mg total) by mouth daily 90 tablet 3    sulfamethoxazole-trimethoprim (BACTRIM DS) 800-160 mg per tablet Take by mouth 2 (two) times a day      sulfamethoxazole-trimethoprim (BACTRIM DS) 800-160 mg per tablet       EPINEPHrine (EPIPEN) 0 3 mg/0 3 mL SOAJ Inject 0 3 mL (0 3 mg total) into a muscle once for 1 dose 0 6 mL 3     No current facility-administered medications for this visit  Allergies   Allergen Reactions    Penicillins Shortness Of Breath    Shellfish-Derived Products - Food Allergy Anaphylaxis     Throat closes    Azithromycin Hives    Levofloxacin Hives    Sulfa Antibiotics Hives       Review of Systems   Constitutional: Positive for fatigue  Negative for fever  HENT: Positive for congestion, sore throat and voice change  Respiratory: Positive for cough  Gastrointestinal: Positive for nausea and vomiting  Genitourinary: Negative  Musculoskeletal: Negative  Skin: Negative  Neurological: Positive for headaches  Psychiatric/Behavioral: Positive for agitation, decreased concentration and dysphoric mood  The patient is nervous/anxious  Video Exam    Vitals:    06/24/21 1008   Weight: 83 kg (183 lb)   Height: 5' 1" (1 549 m)       Physical Exam  Constitutional:       General: She is not in acute distress  Appearance: Normal appearance  She is not ill-appearing  HENT:      Head: Normocephalic and atraumatic  Eyes:      General:         Right eye: No discharge  Left eye: No discharge  Extraocular Movements: Extraocular movements intact  Conjunctiva/sclera: Conjunctivae normal       Pupils: Pupils are equal, round, and reactive to light  Pulmonary:      Effort: Pulmonary effort is normal  No respiratory distress  Comments: Patient with harsh dry cough during telemedicine visit  Neurological:      General: No focal deficit present  Mental Status: She is alert and oriented to person, place, and time  Psychiatric:         Mood and Affect: Mood normal          Behavior: Behavior normal          Thought Content: Thought content normal          Judgment: Judgment normal           I spent 25 minutes with patient today in which greater than 50% of the time was spent in counseling/coordination of care regarding [address] acknowledges that she has consented to an online visit or consultation  She understands that the online visit is based solely on information provided by her, and that, in the absence of a face-to-face physical evaluation by the physician, the diagnosis she receives is both limited and provisional in terms of accuracy and completeness  This is not intended to replace a full medical face-to-face evaluation by the physician  Sarah Church understands and accepts these terms

## 2021-06-24 NOTE — ASSESSMENT & PLAN NOTE
URI   Patient will await results of COVID testing  If she is negative we will add prednisone tapering dose to her current antibiotic  If she is positive she is aware to be out of work for minimum of 10 days and symptoms resolved before she returns to work

## 2021-06-25 ENCOUNTER — TELEPHONE (OUTPATIENT)
Dept: FAMILY MEDICINE CLINIC | Facility: CLINIC | Age: 31
End: 2021-06-25

## 2021-06-25 NOTE — TELEPHONE ENCOUNTER
Patient called and stated that she received her COVID results and they are negative  She asked that you please send medication to her pharmacy    Please call to advise

## 2021-06-25 NOTE — TELEPHONE ENCOUNTER
Patient would like to return to work this Sunday 6/27  Are we able to change the dates on her RTW note?

## 2021-07-21 ENCOUNTER — NURSE TRIAGE (OUTPATIENT)
Dept: OTHER | Facility: OTHER | Age: 31
End: 2021-07-21

## 2021-07-21 PROCEDURE — 99283 EMERGENCY DEPT VISIT LOW MDM: CPT

## 2021-07-22 ENCOUNTER — TELEPHONE (OUTPATIENT)
Dept: FAMILY MEDICINE CLINIC | Facility: CLINIC | Age: 31
End: 2021-07-22

## 2021-07-22 ENCOUNTER — HOSPITAL ENCOUNTER (EMERGENCY)
Facility: HOSPITAL | Age: 31
Discharge: HOME | End: 2021-07-22
Attending: EMERGENCY MEDICINE
Payer: OTHER MISCELLANEOUS

## 2021-07-22 VITALS
HEART RATE: 85 BPM | RESPIRATION RATE: 16 BRPM | SYSTOLIC BLOOD PRESSURE: 138 MMHG | DIASTOLIC BLOOD PRESSURE: 86 MMHG | TEMPERATURE: 98.6 F | OXYGEN SATURATION: 99 %
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
## 2021-07-22 DIAGNOSIS — Z77.21 EXPOSURE TO BLOOD OR BODY FLUID: ICD-10-CM

## 2021-07-22 DIAGNOSIS — S61.239A PUNCTURE WOUND OF FINGER OF RIGHT HAND, INITIAL ENCOUNTER: ICD-10-CM

## 2021-07-22 LAB
ALT SERPL W P-5'-P-CCNC: 21 U/L (ref 12–78)
HBV SURFACE AB SER-ACNC: <3.1 MIU/ML
HBV SURFACE AG SER QL: NORMAL
HCV AB SER QL: NORMAL
HIV 1+2 AB+HIV1 P24 AG SERPL QL IA: NORMAL
HIV1 P24 AG SER QL: NORMAL

## 2021-07-22 PROCEDURE — 87340 HEPATITIS B SURFACE AG IA: CPT | Performed by: EMERGENCY MEDICINE

## 2021-07-22 PROCEDURE — 87806 HIV AG W/HIV1&2 ANTB W/OPTIC: CPT | Performed by: EMERGENCY MEDICINE

## 2021-07-22 PROCEDURE — 99282 EMERGENCY DEPT VISIT SF MDM: CPT | Performed by: EMERGENCY MEDICINE

## 2021-07-22 PROCEDURE — 84460 ALANINE AMINO (ALT) (SGPT): CPT | Performed by: EMERGENCY MEDICINE

## 2021-07-22 PROCEDURE — 36415 COLL VENOUS BLD VENIPUNCTURE: CPT | Performed by: EMERGENCY MEDICINE

## 2021-07-22 PROCEDURE — 86803 HEPATITIS C AB TEST: CPT | Performed by: EMERGENCY MEDICINE

## 2021-07-22 PROCEDURE — 86706 HEP B SURFACE ANTIBODY: CPT | Performed by: EMERGENCY MEDICINE

## 2021-07-22 RX ORDER — MIRABEGRON 50 MG/1
50 TABLET, FILM COATED, EXTENDED RELEASE ORAL DAILY
COMMUNITY
Start: 2021-06-29 | End: 2022-06-29

## 2021-07-22 NOTE — TELEPHONE ENCOUNTER
Patient experienced needlestick last night at work  She would like to return to work as of today   States she needs a RTW letter clearing her to go back,

## 2021-07-22 NOTE — ED PROVIDER NOTES
History  Chief Complaint   Patient presents with    Body Fluid Exposure     pt here from work, pt was checking a blood sugar and went to put the lancet in the desposal and stuck her R pinky finger  Patient is a 31year old female who was helping a co-worker check his blood sugar tonight at work at the [employer] and she was disposing the lancet and accidentally stuck her right 5th finger through a glove with the used lancet which has blood on it  Patient cleansed finger at work  Had hepatitis vaccine as a small child  Last Tdap was in 2019 as per Epic  RHD  No recent old records from this ED seen on computer system  [facility] website checked on this patient and no Rx found  Patient does not know if source has hepatitis or HIV but can ask him tomorrow  I d/w patient that risk of giving PEP far outweighs benefit at this time and she agrees with plan to not give PEP at this time  History provided by:  Patient   used: No        Prior to Admission Medications   Prescriptions Last Dose Informant Patient Reported? Taking?    EPINEPHrine (EPIPEN) 0 3 mg/0 3 mL SOAJ   No No   Sig: Inject 0 3 mL (0 3 mg total) into a muscle once for 1 dose   Multiple Vitamin (MULTIVITAMINS PO)   Yes No   Sig: Take by mouth   albuterol (2 5 mg/3 mL) 0 083 % nebulizer solution   No No   Sig: Take 1 vial (2 5 mg total) by nebulization every 6 (six) hours as needed for wheezing or shortness of breath   albuterol (Ventolin HFA) 90 mcg/act inhaler   No No   Sig: Inhale 2 puffs every 6 (six) hours as needed for wheezing   desonide (DESOWEN) 0 05 % cream   No No   Sig: Apply topically 2 (two) times a day   fluticasone (FLONASE) 50 mcg/act nasal spray   Yes No   Si sprays into each nostril daily   oxybutynin (DITROPAN-XL) 5 mg 24 hr tablet   No No   Sig: Take 1 tablet (5 mg total) by mouth daily   promethazine-dextromethorphan (PHENERGAN-DM) 6 25-15 mg/5 mL oral syrup   Yes No   Sig: Take 5 mL by mouth 4 (four) times a day as needed   sertraline (ZOLOFT) 100 mg tablet   No No   Sig: Take 1 5 tabs daily   sulfamethoxazole-trimethoprim (BACTRIM DS) 800-160 mg per tablet   Yes No      Facility-Administered Medications: None       History reviewed  No pertinent past medical history  Past Surgical History:   Procedure Laterality Date    OTHER SURGICAL HISTORY      Lymphatic Surgery       Family History   Problem Relation Age of Onset    Hypertension Father     Cancer Family     Diabetes Family     Heart disease Family     Transient ischemic attack Family      I have reviewed and agree with the history as documented  E-Cigarette/Vaping     E-Cigarette/Vaping Substances     Social History     Tobacco Use    Smoking status: Never Smoker    Smokeless tobacco: Never Used   Substance Use Topics    Alcohol use: Yes     Comment: Social    Drug use: Never       Review of Systems   Skin: Positive for wound (puncture wound)  Physical Exam  Physical Exam  Vitals reviewed  Constitutional:       General: She is not in acute distress  Skin:     General: Skin is warm and dry  Findings: No erythema or rash  Comments: Small puncture wound of volar right 5th finger without FB noted or evidence of infection  Nontender  Neurological:      Mental Status: She is alert           Vital Signs  ED Triage Vitals [07/22/21 0003]   Temperature Pulse Respirations Blood Pressure SpO2   98 6 °F (37 °C) 85 16 138/86 99 %      Temp Source Heart Rate Source Patient Position - Orthostatic VS BP Location FiO2 (%)   Oral Monitor Sitting Right arm --      Pain Score       --           Vitals:    07/22/21 0003   BP: 138/86   Pulse: 85   Patient Position - Orthostatic VS: Sitting         Visual Acuity      ED Medications  Medications - No data to display    Diagnostic Studies  Results Reviewed     Procedure Component Value Units Date/Time    Hepatitis B surface antibody [[id number]]     Lab Status: No result Specimen: Blood     Rapid HIV 1/2 AB-AG Combo [[id number]]     Lab Status: No result Specimen: Blood     Hepatitis B surface antigen [[id number]]     Lab Status: No result Specimen: Blood     Hepatitis C antibody [[id number]]     Lab Status: No result Specimen: Blood     ALT [[id number]]     Lab Status: No result Specimen: Blood                  No orders to display              Procedures  Procedures         ED Course                                           MDM  Number of Diagnoses or Management Options  Diagnosis management comments: DDx including but not limited to: lancet stick, body fluid exposure, no need for tetanus prophylaxis, hepatitis B prophylaxis, PEP for HIV; doubt foreign body or evidence of infection  Amount and/or Complexity of Data Reviewed  Clinical lab tests: ordered  Decide to obtain previous medical records or to obtain history from someone other than the patient: yes        Disposition  Final diagnoses:   Exposure to blood or body fluid   Needlestick injury accident with exposure to body fluid   Puncture wound of finger of right hand, initial encounter     Time reflects when diagnosis was documented in both MDM as applicable and the Disposition within this note     Time User Action Codes Description Comment    7/22/2021 12:19 AM Jimmy Loyola Add [Z77 21] Exposure to blood or body fluid     7/22/2021 12:19 AM Jimmy Loyola Add André Hopping  1XXA] Needlestick injury accident with exposure to body fluid     7/22/2021 12:19 AM Jimmy Loyola Modify [Z77 21] Exposure to blood or body fluid     7/22/2021 12:19 AM Jimmy Loyola Modify [L51  1XXA] Needlestick injury accident with exposure to body fluid     7/22/2021 12:19 AM Jimmy Loyola Add [L44 367T] Puncture wound of finger of right hand, initial encounter       ED Disposition     ED Disposition Condition Date/Time Comment    Discharge Stable Thu Jul 22, 2021 12:20 AM Mary Coughlin discharge to home/self care              Follow-up Information Follow up With Specialties Details Why Contact Info    Joao Recinos MD Family Medicine Call in 1 day return sooner if increased pain, redness, fever, pus, swelling, numbness, weakness [address] [facility] [phone] [address]  Call in 1 day  [facility] [address] [facility]  [phone]          Patient's Medications   Discharge Prescriptions    No medications on file     No discharge procedures on file      PDMP Review       Value Time User    PDMP Reviewed  Yes 7/21/2021 11:56 PM Nanette Rodriguez MD          ED Provider  Electronically Signed by           Nanette Rodriguez MD  07/22/21 1370

## 2021-07-22 NOTE — TELEPHONE ENCOUNTER
Reason for Disposition   [1] NEEDLESTICK (or other wound from sharp object) AND [2] occurred while at work (Exception: clean un-used needle)    Answer Assessment - Initial Assessment Questions  1  DEVICE: "What punctured the skin?"  (e g , hollow injection needle, suture needle, knife blade, razor)      Diabetic needle  2  LOCATION: "Where is the puncture located?"  (e g , finger)      Finger  3  DEPTH: "How deep do you think the puncture goes?"  (e g , superficial)      deep  4  ONSET: "When did the injury occur?" (e g , minutes, hours, days)        2100  5  HOW OCCURRED: "How did this happen?"      At work  6  SOURCE BODY FLUID: "What body fluid were you exposed to?" (e g , blood, spinal fluid, none)       Blood  7  SOURCE HIV-HEPATITIS: "Does the SOURCE person have Hepatitis or HIV?" (e g , no; unknown; HIV+, Hepatitis+)      Not sure  8  HEPATITIS B VACCINE: "Have you been fully vaccinated for Hepatitis B?"      Yes   9  TETANUS VACCINE: "Have you been fully vaccinated for tetanus?" "When was your last tetanus booster?"      N/A  10   PREGNANCY: "Is there any chance you are pregnant?" "When was your last menstrual period?"        N/A    Protocols used: NEEDLESTICK-ADULT-

## 2021-07-22 NOTE — Clinical Note
Gibson Salmon was seen and treated in our emergency department on 7/21/2021  Diagnosis:     Qasim Anders  may return to work on return date  She may return on this date: 07/22/2021         If you have any questions or concerns, please don't hesitate to call        Ricardo Nelson PA-C    ______________________________           _______________          _______________  Hospital Representative                              Date                                Time

## 2021-07-27 ENCOUNTER — TELEMEDICINE (OUTPATIENT)
Dept: FAMILY MEDICINE CLINIC | Facility: CLINIC | Age: 31
End: 2021-07-27
Payer: COMMERCIAL

## 2021-07-27 VITALS — HEIGHT: 61 IN | WEIGHT: 180 LBS | BODY MASS INDEX: 33.99 KG/M2

## 2021-07-27 DIAGNOSIS — F41.9 ANXIETY: ICD-10-CM
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
## 2021-07-27 PROBLEM — J06.9 UPPER RESPIRATORY TRACT INFECTION: Status: RESOLVED | Noted: 2021-06-24 | Resolved: 2021-07-27

## 2021-07-27 PROCEDURE — 3008F BODY MASS INDEX DOCD: CPT | Performed by: FAMILY MEDICINE

## 2021-07-27 PROCEDURE — 99213 OFFICE O/P EST LOW 20 MIN: CPT | Performed by: FAMILY MEDICINE

## 2021-07-27 PROCEDURE — 1036F TOBACCO NON-USER: CPT | Performed by: FAMILY MEDICINE

## 2021-07-27 NOTE — ASSESSMENT & PLAN NOTE
Needlestick  Patient had a puncture with a diabetic lancet after checking did it a blood sugar at work for a  Co-worker  During evaluation she was noted to not have antibodies to hepatitis-B  Patient was  Recommended to restart her hepatitis-B vaccination and will call the office to schedule initial vaccine and repeat vaccination in 2 months and 6 months

## 2021-07-27 NOTE — ASSESSMENT & PLAN NOTE
Anxiety  Overall the patient appears to be showing improvement with her previous symptoms  She will continue with current dose of Zoloft 150 milligrams daily

## 2021-07-27 NOTE — PROGRESS NOTES
Virtual Regular Visit    Verification of patient location:    Patient is located in the following state in which I hold an active license [place]      Assessment/Plan:    Problem List Items Addressed This Visit        Other    Anxiety       Anxiety  Overall the patient appears to be showing improvement with her previous symptoms  She will continue with current dose of Zoloft 150 milligrams daily  Needle stick, hypodermic, accidental - Primary      Needlestick  Patient had a puncture with a diabetic lancet after checking did it a blood sugar at work for a  Co-worker  During evaluation she was noted to not have antibodies to hepatitis-B  Patient was  Recommended to restart her hepatitis-B vaccination and will call the office to schedule initial vaccine and repeat vaccination in 2 months and 6 months  Reason for visit is   Chief Complaint   Patient presents with    Follow-up     Testing for worker comp-blood work        Encounter provider Dave Gunn MD    Provider located at [address] [place] [phone]      Recent Visits  Date Type Provider Dept   07/22/21 Telephone [facility] recent visits within past 7 days and meeting all other requirements  Today's Visits  Date Type Provider Dept   07/27/21 Telemedicine Dave Gunn MD [facility] today's visits and meeting all other requirements  Future Appointments  No visits were found meeting these conditions  Showing future appointments within next 150 days and meeting all other requirements       The patient was identified by name and date of birth  Mirna Chávez was informed that this is a telemedicine visit and that the visit is being conducted through [address] [place] [address] Now and patient was informed that this is a secure, HIPAA-compliant platform  She agrees to proceed     My office door was closed  No one else was in the room    She acknowledged consent and understanding of privacy and security of the video platform  The patient has agreed to participate and understands they can discontinue the visit at any time  Patient is aware this is a billable service  Clara Alanis is a 31 y o  female        Patient having telemedicine visit to review recent incident where she was stuck with a lancet after checking someone's blood sugar  Patient had recent blood test to urgent care center that was negative including hepatitis-B surface antibody  Patient recalls having hepatitis-B immunization when she was a child          patient also reports that she has had some improvement with her overall anxiety as since increasing her Zoloft to 150 milligrams daily  We did fill out FMLA paperwork for the patient is employer to be used as needed  History reviewed  No pertinent past medical history      Past Surgical History:   Procedure Laterality Date    OTHER SURGICAL HISTORY      Lymphatic Surgery       Current Outpatient Medications   Medication Sig Dispense Refill    albuterol (2 5 mg/3 mL) 0 083 % nebulizer solution Take 1 vial (2 5 mg total) by nebulization every 6 (six) hours as needed for wheezing or shortness of breath 25 vial 5    albuterol (Ventolin HFA) 90 mcg/act inhaler Inhale 2 puffs every 6 (six) hours as needed for wheezing 1 Inhaler 3    desonide (DESOWEN) 0 05 % cream Apply topically 2 (two) times a day 60 g 2    EPINEPHrine (EPIPEN) 0 3 mg/0 3 mL SOAJ Inject 0 3 mL (0 3 mg total) into a muscle once for 1 dose 0 6 mL 3    fluticasone (FLONASE) 50 mcg/act nasal spray 2 sprays into each nostril daily      Multiple Vitamin (MULTIVITAMINS PO) Take by mouth      promethazine-dextromethorphan (PHENERGAN-DM) 6 25-15 mg/5 mL oral syrup Take 5 mL by mouth 4 (four) times a day as needed      sertraline (ZOLOFT) 100 mg tablet Take 1 5 tabs daily 135 tablet 1    Mirabegron ER (Myrbetriq) 50 MG TB24 Take 50 mg by mouth daily (Patient not taking: Reported on 7/27/2021)       No current facility-administered medications for this visit  Allergies   Allergen Reactions    Penicillins Shortness Of Breath    Shellfish-Derived Products - Food Allergy Anaphylaxis     Throat closes    Azithromycin Hives    Levofloxacin Hives    Sulfa Antibiotics Hives       Review of Systems   Constitutional: Negative  HENT: Negative  Eyes: Negative  Respiratory: Negative  Cardiovascular: Negative  Gastrointestinal: Negative  Genitourinary: Negative  Musculoskeletal: Negative  Skin: Negative  Neurological: Negative  Psychiatric/Behavioral: The patient is nervous/anxious  Video Exam    Vitals:    07/27/21 0945   TempSrc: Oral   Weight: 81 6 kg (180 lb)   Height: 5' 1" (1 549 m)       Physical Exam  Constitutional:       General: She is not in acute distress  Appearance: Normal appearance  She is not ill-appearing  Eyes:      General:         Right eye: No discharge  Left eye: No discharge  Extraocular Movements: Extraocular movements intact  Conjunctiva/sclera: Conjunctivae normal       Pupils: Pupils are equal, round, and reactive to light  Pulmonary:      Effort: Pulmonary effort is normal  No respiratory distress  Neurological:      General: No focal deficit present  Mental Status: She is alert and oriented to person, place, and time  Psychiatric:         Mood and Affect: Mood normal          Behavior: Behavior normal          Thought Content: Thought content normal          Judgment: Judgment normal           I spent 15 minutes with patient today in which greater than 50% of the time was spent in counseling/coordination of care regarding [address] verbally agrees to participate in [employer]   Pt is aware that [employer] could be limited without vital signs or the ability to perform a full hands-on physical henrietta Vaughn understands she or the provider may request at any time to terminate the video visit and request the patient to seek care or treatment in person

## 2021-08-05 ENCOUNTER — CLINICAL SUPPORT (OUTPATIENT)
Dept: FAMILY MEDICINE CLINIC | Facility: CLINIC | Age: 31
End: 2021-08-05
Payer: OTHER MISCELLANEOUS

## 2021-08-05 VITALS — TEMPERATURE: 98 F

## 2021-08-05 DIAGNOSIS — Z23 NEED FOR HEPATITIS B VACCINATION: Primary | ICD-10-CM

## 2021-08-05 PROCEDURE — 96372 THER/PROPH/DIAG INJ SC/IM: CPT

## 2021-08-05 PROCEDURE — 90746 HEPB VACCINE 3 DOSE ADULT IM: CPT

## 2021-08-05 PROCEDURE — 90471 IMMUNIZATION ADMIN: CPT

## 2021-10-15 ENCOUNTER — CLINICAL SUPPORT (OUTPATIENT)
Dept: FAMILY MEDICINE CLINIC | Facility: CLINIC | Age: 31
End: 2021-10-15
Payer: OTHER MISCELLANEOUS

## 2021-10-15 DIAGNOSIS — Z23 NEED FOR HEPATITIS B VACCINATION: Primary | ICD-10-CM

## 2021-10-15 PROCEDURE — 90471 IMMUNIZATION ADMIN: CPT

## 2021-10-15 PROCEDURE — 90746 HEPB VACCINE 3 DOSE ADULT IM: CPT

## 2022-01-03 ENCOUNTER — TELEMEDICINE (OUTPATIENT)
Dept: FAMILY MEDICINE CLINIC | Facility: CLINIC | Age: 32
End: 2022-01-03
Payer: COMMERCIAL

## 2022-01-03 VITALS — BODY MASS INDEX: 34.17 KG/M2 | HEIGHT: 61 IN | WEIGHT: 181 LBS

## 2022-01-03 DIAGNOSIS — R00.2 PALPITATIONS: ICD-10-CM

## 2022-01-03 DIAGNOSIS — R53.82 CHRONIC FATIGUE: Primary | ICD-10-CM

## 2022-01-03 PROCEDURE — 3008F BODY MASS INDEX DOCD: CPT | Performed by: FAMILY MEDICINE

## 2022-01-03 PROCEDURE — 1036F TOBACCO NON-USER: CPT | Performed by: FAMILY MEDICINE

## 2022-01-03 PROCEDURE — 99213 OFFICE O/P EST LOW 20 MIN: CPT | Performed by: FAMILY MEDICINE

## 2022-01-03 RX ORDER — TROSPIUM CHLORIDE 20 MG/1
TABLET, FILM COATED ORAL
COMMUNITY
Start: 2021-12-28

## 2022-01-03 NOTE — ASSESSMENT & PLAN NOTE
Fatigue  Patient will obtain blood work as ordered for further evaluation  If blood work is within normal limits we may consider switching patient's Zoloft to another medication such as Cymbalta

## 2022-01-03 NOTE — PROGRESS NOTES
Virtual Regular Visit    Verification of patient location:    Patient is located in the following state in which I hold an active license [place]      Assessment/Plan:    Problem List Items Addressed This Visit        Other    Palpitations     Palpitations  I explained to the patient that symptoms could be related to recent withdrawal from her SSRI medication  We will obtain blood work as ordered for evaluation  Patient denies any chest pain or shortness of breath  She will call if symptoms persist over the next week after she has restarted Zoloft         Relevant Orders    CBC    Comprehensive metabolic panel    TSH, 3rd generation    T3, free    T4, free    Mononucleosis screen    Sedimentation rate, automated    Chronic fatigue - Primary     Fatigue  Patient will obtain blood work as ordered for further evaluation  If blood work is within normal limits we may consider switching patient's Zoloft to another medication such as Cymbalta  Relevant Orders    CBC    Comprehensive metabolic panel    TSH, 3rd generation    T3, free    T4, free    Mononucleosis screen    Sedimentation rate, automated               Reason for visit is   Chief Complaint   Patient presents with    Follow-up     med check        Encounter provider Malachi Marquis MD    Provider located at [address] [place] [address]  97       Recent Visits  No visits were found meeting these conditions  Showing recent visits within past 7 days and meeting all other requirements  Today's Visits  Date Type Provider Dept   01/03/22 Telemedicine Malachi Marquis MD [facility] today's visits and meeting all other requirements  Future Appointments  No visits were found meeting these conditions  Showing future appointments within next 150 days and meeting all other requirements       The patient was identified by name and date of birth   Arclifton Jeff was informed that this is a telemedicine visit and that the visit is being conducted through [address] [place] [address] Road Now and patient was informed that this is a secure, HIPAA-compliant platform  She agrees to proceed     My office door was closed  No one else was in the room  She acknowledged consent and understanding of privacy and security of the video platform  The patient has agreed to participate and understands they can discontinue the visit at any time  Patient is aware this is a billable service  Marina Buchanan is a 27 y o  female       Patient the office with complaints of continued feelings of fogginess and fatigue  Many times she does not have the urge to want to get out of bed but does so to go to work  Symptoms have been present for several months  She denies any recent illness  Her COVID vaccinations up-to-date  She did report that she had been off of her sertraline for approximately 3 days which caused her to have palpitations  She restarted medication 4 days ago when symptoms appear to be improving  No past medical history on file      Past Surgical History:   Procedure Laterality Date    OTHER SURGICAL HISTORY      Lymphatic Surgery       Current Outpatient Medications   Medication Sig Dispense Refill    albuterol (2 5 mg/3 mL) 0 083 % nebulizer solution Take 1 vial (2 5 mg total) by nebulization every 6 (six) hours as needed for wheezing or shortness of breath 25 vial 5    albuterol (Ventolin HFA) 90 mcg/act inhaler Inhale 2 puffs every 6 (six) hours as needed for wheezing 1 Inhaler 3    desonide (DESOWEN) 0 05 % cream Apply topically 2 (two) times a day 60 g 2    sertraline (ZOLOFT) 100 mg tablet Take 1 5 tabs daily 135 tablet 1    trospium chloride (SANCTURA) 20 mg tablet       EPINEPHrine (EPIPEN) 0 3 mg/0 3 mL SOAJ Inject 0 3 mL (0 3 mg total) into a muscle once for 1 dose 0 6 mL 3    fluticasone (FLONASE) 50 mcg/act nasal spray 2 sprays into each nostril daily      Mirabegron ER (Myrbetriq) 50 MG TB24 Take 50 mg by mouth daily (Patient not taking: Reported on 7/27/2021)      Multiple Vitamin (MULTIVITAMINS PO) Take by mouth (Patient not taking: Reported on 1/3/2022 )      promethazine-dextromethorphan (PHENERGAN-DM) 6 25-15 mg/5 mL oral syrup Take 5 mL by mouth 4 (four) times a day as needed (Patient not taking: Reported on 1/3/2022 )       No current facility-administered medications for this visit  Allergies   Allergen Reactions    Penicillins Shortness Of Breath    Shellfish-Derived Products - Food Allergy Anaphylaxis     Throat closes    Azithromycin Hives    Levofloxacin Hives    Sulfa Antibiotics Hives       Review of Systems   Constitutional: Positive for fatigue  HENT: Negative  Eyes: Negative  Respiratory: Negative  Cardiovascular: Positive for palpitations  Negative for chest pain and leg swelling  Gastrointestinal: Negative  Endocrine: Negative  Genitourinary: Negative  Musculoskeletal: Negative  Skin: Negative  Allergic/Immunologic: Negative  Neurological: Negative  Hematological: Negative  Psychiatric/Behavioral: Negative  Video Exam    Vitals:    01/03/22 0950   Weight: 82 1 kg (181 lb)   Height: 5' 1" (1 549 m)       Physical Exam  Constitutional:       General: She is not in acute distress  Appearance: Normal appearance  She is not ill-appearing  HENT:      Head: Normocephalic and atraumatic  Eyes:      General:         Right eye: No discharge  Left eye: No discharge  Extraocular Movements: Extraocular movements intact  Conjunctiva/sclera: Conjunctivae normal       Pupils: Pupils are equal, round, and reactive to light  Pulmonary:      Effort: No respiratory distress  Neurological:      General: No focal deficit present  Mental Status: She is alert and oriented to person, place, and time  Mental status is at baseline     Psychiatric:         Mood and Affect: Mood normal          Behavior: Behavior normal          Thought Content: Thought content normal          Judgment: Judgment normal           I spent 15 minutes directly with the patient during this visit    [address] verbally agrees to participate in [employer]  Pt is aware that [employer] could be limited without vital signs or the ability to perform a full hands-on physical Avonne Screws understands she or the provider may request at any time to terminate the video visit and request the patient to seek care or treatment in person

## 2022-01-03 NOTE — ASSESSMENT & PLAN NOTE
Palpitations  I explained to the patient that symptoms could be related to recent withdrawal from her SSRI medication  We will obtain blood work as ordered for evaluation  Patient denies any chest pain or shortness of breath    She will call if symptoms persist over the next week after she has restarted Zoloft